# Patient Record
Sex: FEMALE | Race: WHITE | URBAN - METROPOLITAN AREA
[De-identification: names, ages, dates, MRNs, and addresses within clinical notes are randomized per-mention and may not be internally consistent; named-entity substitution may affect disease eponyms.]

---

## 2017-01-02 ENCOUNTER — TRANSFERRED RECORDS (OUTPATIENT)
Dept: HEALTH INFORMATION MANAGEMENT | Facility: CLINIC | Age: 22
End: 2017-01-02

## 2017-01-12 ENCOUNTER — OFFICE VISIT (OUTPATIENT)
Dept: FAMILY MEDICINE | Facility: CLINIC | Age: 22
End: 2017-01-12
Payer: COMMERCIAL

## 2017-01-12 VITALS
BODY MASS INDEX: 22.49 KG/M2 | TEMPERATURE: 97.6 F | HEART RATE: 94 BPM | SYSTOLIC BLOOD PRESSURE: 107 MMHG | DIASTOLIC BLOOD PRESSURE: 71 MMHG | OXYGEN SATURATION: 100 % | WEIGHT: 135 LBS | RESPIRATION RATE: 16 BRPM | HEIGHT: 65 IN

## 2017-01-12 DIAGNOSIS — F43.23 ADJUSTMENT DISORDER WITH MIXED ANXIETY AND DEPRESSED MOOD: Primary | ICD-10-CM

## 2017-01-12 PROCEDURE — 99203 OFFICE O/P NEW LOW 30 MIN: CPT | Performed by: FAMILY MEDICINE

## 2017-01-12 ASSESSMENT — ENCOUNTER SYMPTOMS
FEVER: 0
MEMORY LOSS: 0
INSOMNIA: 1
DIAPHORESIS: 0
PALPITATIONS: 0
SORE THROAT: 0
DEPRESSION: 1
DIZZINESS: 0
DOUBLE VISION: 0
WEIGHT LOSS: 0
SHORTNESS OF BREATH: 0
CHILLS: 0
HEADACHES: 0
WEAKNESS: 0
BLURRED VISION: 0
NERVOUS/ANXIOUS: 1
COUGH: 0

## 2017-01-12 ASSESSMENT — LIFESTYLE VARIABLES: SUBSTANCE_ABUSE: 0

## 2017-01-12 NOTE — PROGRESS NOTES
"HPI CC:  20 yo F new to the clinic presents to establish care for mood disorder and would like  A referral for counseling.      Anxiety and depression: this started June 2016 when she unfortunately delivered a stillborn baby (due in September).  She has noticed increased anxiety, worrying about small things that wouldn't normally bother her, feeling down and depressed, having poor appetite (denies weight loss), and sleeping poorly (hard to stay asleep, wakes early).  She denies any SI/HI, panic attacks, manic symptoms.  She denies a prior history of anxiety or depression.  She denies any family h/o mood disorder.  She has never been on a medication nor been hospitalized with mood.  She lives at home with her parents and sibling.  She is in a relationship with her boyfriend.  She feels safe in the relationship and at home.  Her mother is her main support person.  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  She denies any substance abuse.  She notes she was in the ER with \"passing out\" due to not eating/dehydration.      Review of Systems   Constitutional: Negative for fever, chills, weight loss, malaise/fatigue and diaphoresis.   HENT: Negative for congestion and sore throat.    Eyes: Negative for blurred vision and double vision.   Respiratory: Negative for cough and shortness of breath.    Cardiovascular: Negative for chest pain, palpitations and leg swelling.   Neurological: Negative for dizziness, weakness and headaches.   Psychiatric/Behavioral: Positive for depression. Negative for suicidal ideas, memory loss and substance abuse. The patient is nervous/anxious and has insomnia.        No Known Allergies   No current outpatient prescriptions on file.     No current facility-administered medications for this visit.     Active Ambulatory Problems     Diagnosis Date Noted     No Active Ambulatory Problems     Resolved Ambulatory Problems     Diagnosis Date Noted     No Resolved Ambulatory Problems     No " Additional Past Medical History     Social History     Social History     Marital Status: Single     Spouse Name: N/A     Number of Children: N/A     Years of Education: N/A     Occupational History     Not on file.     Social History Main Topics     Smoking status: Never Smoker      Smokeless tobacco: Not on file     Alcohol Use: No     Drug Use: No     Sexual Activity:     Partners: Male     Birth Control/ Protection: Pill     Other Topics Concern     Not on file     Social History Narrative     No narrative on file       Physical Exam   Constitutional: She is well-developed, well-nourished, and in no distress. No distress.   HENT:   Nose: Nose normal.   Mouth/Throat: Oropharynx is clear and moist. No oropharyngeal exudate.   Eyes: Conjunctivae and EOM are normal. Pupils are equal, round, and reactive to light. Right eye exhibits no discharge. Left eye exhibits no discharge. No scleral icterus.   Neck: Normal range of motion. Neck supple. No thyromegaly present.   Cardiovascular: Normal rate, regular rhythm, normal heart sounds and intact distal pulses.  Exam reveals no gallop and no friction rub.    No murmur heard.  Pulmonary/Chest: Effort normal and breath sounds normal. No respiratory distress. She has no wheezes. She has no rales.   Abdominal: Soft. Bowel sounds are normal. She exhibits no distension. There is no tenderness. There is no rebound and no guarding.   Lymphadenopathy:     She has no cervical adenopathy.   Neurological: She is alert. She has normal reflexes.   Skin: Skin is warm and dry. She is not diaphoretic.   Psychiatric: Memory and judgment normal. Her mood appears anxious. She exhibits a depressed mood. She expresses no homicidal and no suicidal ideation. She expresses no suicidal plans and no homicidal plans. She has a flat affect.   She appears depressed.   Speech is sparse but otherwise normal rate/rhythm.  Eye contact fairly normal.      Vitals reviewed.    /71 mmHg  Pulse 94   "Temp(Src) 97.6  F (36.4  C) (Oral)  Resp 16  Ht 5' 5\" (1.651 m)  Wt 135 lb (61.236 kg)  BMI 22.47 kg/m2  SpO2 100%  Breastfeeding? No    A/P  Adjustment disorder with anxiety and depression.      Agree with starting therapy, also recommended good nutrition, hydration, and exercise to improve mood.  I discussed that if her mood symptoms are worsening, to consider starting an SSRI (or mirtazepine to help with sleep and appetite).  I reviewed her ER note on the Near syncopal event, and reviewed her EKG (NSR with sinus arrhythma), UA with 100 protein and 15 ketones and 6-10 hyaline casts, glucose 148, normal renal fxn and lytes, neg troponin, WBC 13.2, normal H/H, platelets 444, normal LFTs,   Her symptoms were attributed to marijuana use and dehydration/hypotension resulting in vasovagal episode.  (If ongoing marijuana use, would also consider mood disorder secondary to substance, but she did not endorse this to me.)  She has had no prior or further events.    "

## 2017-01-12 NOTE — MR AVS SNAPSHOT
After Visit Summary   1/12/2017    Gwyn Mike    MRN: [id number]           Patient Information     Date Of Birth          1995        Visit Information        Provider Department      1/12/2017 11:20 AM Pari Watkins MD [facility]        Today's Diagnoses     Adjustment disorder with mixed anxiety and depressed mood    -  1        Follow-ups after your visit        Additional Services     MENTAL HEALTH REFERRAL       Your provider has referred you to: [facility]: [facility] - Counseling (Individual/Couples/Family) - [facility] [phone]   [email or web address]   *Patient will be contacted by [facility] scheduling partner, [facility]), to schedule an appointment.  Patients may also call [facility] to schedule.    All scheduling is subject to the client's specific insurance plan & benefits, provider/location availability, and provider clinical specialities.  Please arrive 15 minutes early for your first appointment and bring your completed paperwork.    Please be aware that coverage of these services is subject to the terms and limitations of your health insurance plan.  Call member services at your health plan with any benefit or coverage questions.                  Who to contact     If you have questions or need follow up information about today's clinic visit or your schedule please contact [facility] directly at [phone].  Normal or non-critical lab and imaging results will be communicated to you by MyChart, letter or phone within 4 business days after the clinic has received the results. If you do not hear from us within 7 days, please contact the clinic through MyChart or phone. If you have a critical or abnormal lab result, we will notify you by phone as soon as possible.  Submit refill requests through [employer] or call your  "pharmacy and they will forward the refill request to us. Please allow 3 business days for your refill to be completed.          Additional Information About Your Visit        MyChart Information     [employer] lets you send messages to your doctor, view your test results, renew your prescriptions, schedule appointments and more. To sign up, go to [email or web address] . Click on \"Log in\" on the left side of the screen, which will take you to the Welcome page. Then click on \"Sign up Now\" on the right side of the page.     You will be asked to enter the access code listed below, as well as some personal information. Please follow the directions to create your username and password.     Your access code is: [email or web address]  Expires: 2017 11:45 AM     Your access code will  in 90 days. If you need help or a new code, please call your [place] clinic or [phone].        Care EveryWhere ID     This is your Care EveryWhere ID. This could be used by other organizations to access your [place] medical records  [id number]        Your Vitals Were     Pulse Temperature Respirations Height BMI (Body Mass Index) Pulse Oximetry    94 97.6  F (36.4  C) (Oral) 16 5' 5\" (1.651 m) 22.47 kg/m2 100%    Breastfeeding?                   No            Blood Pressure from Last 3 Encounters:   17 107/71    Weight from Last 3 Encounters:   17 135 lb (61.236 kg) (63.70 %*)     * Growth percentiles are based on CDC 2-20 Years data.              We Performed the Following     MENTAL HEALTH REFERRAL        Primary Care Provider    None Specified       No primary provider on file.        Thank you!     Thank you for choosing [facility]  for your care. Our goal is always to provide you with excellent care. Hearing back from our patients is one way we can continue to improve our services. Please take a few minutes to complete the written survey that you may receive in the mail after your visit with us. " Thank you!             Your Updated Medication List - Protect others around you: Learn how to safely use, store and throw away your medicines at www.disposemymeds.org.      Notice  As of 1/12/2017 11:45 AM    You have not been prescribed any medications.

## 2017-01-12 NOTE — NURSING NOTE
"Chief Complaint   Patient presents with     Establish Care       Initial /71 mmHg  Pulse 94  Temp(Src) 97.6  F (36.4  C) (Oral)  Resp 16  Ht 5' 5\" (1.651 m)  Wt 135 lb (61.236 kg)  BMI 22.47 kg/m2  SpO2 100%  Breastfeeding? No Estimated body mass index is 22.47 kg/(m^2) as calculated from the following:    Height as of this encounter: 5' 5\" (1.651 m).    Weight as of this encounter: 135 lb (61.236 kg).  BP completed using cuff size: regular      Edmundo Sánchez MA      "

## 2017-01-13 ASSESSMENT — PATIENT HEALTH QUESTIONNAIRE - PHQ9: SUM OF ALL RESPONSES TO PHQ QUESTIONS 1-9: 10

## 2017-02-26 ENCOUNTER — TRANSFERRED RECORDS (OUTPATIENT)
Dept: HEALTH INFORMATION MANAGEMENT | Facility: CLINIC | Age: 22
End: 2017-02-26

## 2017-03-08 ENCOUNTER — OFFICE VISIT (OUTPATIENT)
Dept: PSYCHOLOGY | Facility: CLINIC | Age: 22
End: 2017-03-08
Attending: FAMILY MEDICINE
Payer: COMMERCIAL

## 2017-03-08 DIAGNOSIS — F43.22 ADJUSTMENT DISORDER WITH ANXIETY: Primary | ICD-10-CM

## 2017-03-08 PROCEDURE — 90791 PSYCH DIAGNOSTIC EVALUATION: CPT | Performed by: SOCIAL WORKER

## 2017-03-08 ASSESSMENT — PATIENT HEALTH QUESTIONNAIRE - PHQ9: 5. POOR APPETITE OR OVEREATING: MORE THAN HALF THE DAYS

## 2017-03-08 ASSESSMENT — ANXIETY QUESTIONNAIRES
5. BEING SO RESTLESS THAT IT IS HARD TO SIT STILL: NOT AT ALL
1. FEELING NERVOUS, ANXIOUS, OR ON EDGE: SEVERAL DAYS
IF YOU CHECKED OFF ANY PROBLEMS ON THIS QUESTIONNAIRE, HOW DIFFICULT HAVE THESE PROBLEMS MADE IT FOR YOU TO DO YOUR WORK, TAKE CARE OF THINGS AT HOME, OR GET ALONG WITH OTHER PEOPLE: NOT DIFFICULT AT ALL
7. FEELING AFRAID AS IF SOMETHING AWFUL MIGHT HAPPEN: SEVERAL DAYS
6. BECOMING EASILY ANNOYED OR IRRITABLE: SEVERAL DAYS
GAD7 TOTAL SCORE: 10
2. NOT BEING ABLE TO STOP OR CONTROL WORRYING: NEARLY EVERY DAY
3. WORRYING TOO MUCH ABOUT DIFFERENT THINGS: MORE THAN HALF THE DAYS

## 2017-03-08 NOTE — MR AVS SNAPSHOT
"                  MRN:[id number]                      After Visit Summary   3/8/2017    Gwyn Mike    MRN: [id number]           Visit Information        Provider Department      3/8/2017 2:00 PM Janice Galarza LICSW [facility] Generic      Your next 10 appointments already scheduled     Mar 20, 2017 10:00 AM CDT   Return Visit with Peyton Perdomo, [facility]    [address] [place] [address]   [phone]              MyChart Information     [employer] lets you send messages to your doctor, view your test results, renew your prescriptions, schedule appointments and more. To sign up, go to [email or web address] . Click on \"Log in\" on the left side of the screen, which will take you to the Welcome page. Then click on \"Sign up Now\" on the right side of the page.     You will be asked to enter the access code listed below, as well as some personal information. Please follow the directions to create your username and password.     Your access code is: [email or web address]  Expires: 2017 11:45 AM     Your access code will  in 90 days. If you need help or a new code, please call your [place] clinic or [phone].        Care EveryWhere ID     This is your Care EveryWhere ID. This could be used by other organizations to access your [place] medical records  [id number]        "

## 2017-03-08 NOTE — Clinical Note
This ptx completed her DA within [facility] and is wishing to transfer care to [facility] which is closer to home. DA will be dictated shortly for your review.

## 2017-03-08 NOTE — PROGRESS NOTES
"INITIAL ADULT ASSESSMENT      DATE OF SERVICE:  03/08/2017.      IDENTIFYING INFORMATION:  Gwyn Mike is a 21-year-old single , pregnant female, self-referred at the encouragement of her new GP at [facility] to this intake and she is alone in session with author.  Mother joined 10 minutes to the end of session to assist in transfer of client to a different clinic closer to home.       CLIENT'S STATEMENT OF PRESENTING CONCERN:  \"I have anxiety and I was feeling depressed.\"      HISTORY OF PRESENTING CONCERN:  Client states that she has been more anxious than depressed.  She stated that on 06/2016, she experienced a still birth 06/09 at 7 months of pregnancy.  She was offered a grief group as a resource, but did not go.  She stated that her symptoms became worse toward fall and she was admitted to the ER on 01/02/2017 after she fainted due to dehydration.  She met with her new GP and requested a referral to counseling in an effort to \"learn tips to relax and stay calm.\"  She states she is worrying a lot, especially about the new pregnancy.  She is 7 weeks pregnant, her second pregnancy with the same partner, her boyfriend,19, of 2 years.  She states she has her mother and her boyfriend for support and lists her strengths as \"I have strong family and my friends' support.\"  Client lives with her parents and her younger brother.  Her boyfriend lives with his parents. They have been together 2 years.  Client is being followed by an OB/GYN through [facility] who is a high-risk pregnancy consultant.  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  Due to being pregnant her OB states that she should not do employment that requires her to stand for more than 4 hours.  She is in the process of looking for work where she can sit such as office work.  She states her boyfriend is unemployed.      SOCIAL HISTORY:  Client grew up in [place], [place] and her parents remain " ".  She is the oldest of 2, having a 12-year-old brother in 7th grade.  Her mother works for the [employer] and her father works at the [employer].  She reports \"I had a happy, normal childhood.\"  She describes her current relationships as \"pretty good, I'm getting closer with all of them.\"  She has only been in 1 relationship, which is her current relationship.  She denies involvement with the legal system, past or current.  She graduated from [employer] and tried to attend [employer] school at [employer] this past fall of 2016, but had to quit for financial reasons.  She was raised Hinduism, but does not attend [employer] and English is her preferred written and spoken language.      MENTAL HEALTH HISTORY:  Client is unaware of other mental health issues in her family of origin.  Client has never had counseling before and has never been on medication before.        CHEMICAL USE HISTORY:  Client denies awareness of chemical use disorder in her family of origin and denies chemical use disorder in herself.  Her current CAGE-AID score is 0.  She denies using alcohol, tobacco, marijuana and other substances.  She does acknowledge 5 caffeinated beverages a month.      SIGNIFICANT LOSSES, TRAUMATIC EVENTS AND ABUSE HISTORY:  Client cites the stillbirth as a significant and traumatic loss.  She states both were planned pregnancies.  She states that [facility] through the first  pregnancy only half of her placenta was functional which caused the demise of the fetus.  The client denies a history of physical, sexual or emotional abuse or neglect.  She states that there are no safety concerns in her current dating relationship.      SAFETY ISSUES AND PLAN FOR SAFETY AND RISK MANAGEMENT:  Client denies a history of or current concerns with SI, HI or SIB.  She denies harm to other people or property and denies that there are firearms in her home.      MEDICAL ISSUES:  Client is new to [facility], having met only once with a [facility].  She denies a history of psychiatry and denies a history of major medical problems, acute or chronic pain or concern about her weight or eating habits.  She is on baby aspirin and prenatal vitamins.  Denies allergies or adverse reactions to medications.      MENTAL STATUS ASSESSMENT:  Client appeared somewhat younger than her stated age.  Her eye contact was fair.  Her orientation was to person, place, time and situation.  Her mood seemed mildly anxious and sad.  Affect while appropriate, was somewhat flat.  Her thought content appeared clear with denial of hallucinations, delusions, paranoia, SI or HI.  Her thought form was logical, coherent and goal directed.  Her psychomotor behavior was normal and speech rate and volume were both normal.      PSYCHOLOGICAL SYMPTOMS:  Client endorses grief since 06/2016, relationship issues, intermittent with her boyfriend stating that they argue a lot about little things, financial stressors and racing thoughts.  Client's PHQ-9 score is a 3 citing fatigue due to pregnancy and difficulty falling asleep for the past 2 weeks.  Her DULCE MARIA-7 score is a 10, citing nervousness, worry, difficulty controlling worry, difficulty relaxing, irritability and fearfulness as primary symptoms.      FUNCTIONAL STATUS:  Client reports general stability to manage activities of daily living and is motivated to seek counseling to broaden her base of support and to assist in managing anxiety.      DSM 5 DIAGNOSES:     1.  Adjustment Disorder with anxiety.    2.  Psychosocial stressors and context:  High-risk pregnancy 7 weeks gestation; still birth, 06/2016.    3.  WHODAS 2.0 is a 13; attendance agreement has been  reviewed and signed by client.        PRELIMINARY TREATMENT PLAN:  Mother came into session the final 10 minutes in order to assist in planning transfer from the [facility] to a clinic closer to the family's home.  They decided  [facility] would be a good fit and the transfer was facilitated in session through [facility] intake.  Client will return to [facility] on  at 10am. to meet with DEJAH Ladd, to begin individual therapy.  She is clearly motivated to begin working on developing better coping skills to manage anxiety and to process her residual grief.  This intake may be released to client upon her request with written authorization.         QUINTON RODRIGUEZ, [facility]             D: 2017 15:42   T: 2017 17:47   MT: PHILIPPE      Name:     KWASI WINTERS   MRN:      [id number]        Account:      [id number]   :      1995           Service Date: 2017      Document: [id number]

## 2017-03-08 NOTE — PROGRESS NOTES
Routed note to PCP                 Progress Note - Initial Session    Client Name:  Gwyn Mike Date: 3/8/2017           Service Type: Individual      Session Start Time: 2pm  Session End Time: 250      Session Length: 38 - 52      Session #: 1     Attendees: Client and with mother joining last 10 minutes.         Diagnostic Assessment in progress.  Unable to complete documentation at the conclusion of the first session due to time limits.      Mental Status Assessment:  Appearance:   Client and with mother joining last 10 minutes.  Eye Contact:   Fair   Psychomotor Behavior: Normal   Attitude:   Cooperative   Orientation:   All  Speech   Rate / Production: Normal  Slow    Volume:  Soft   Mood:    Anxious   Affect:    Flat   Thought Content:  Clear   Thought Form:  Coherent  Logical   Insight:    Fair       Safety Issues and Plan for Safety and Risk Management:  Client denies current fears or concerns for personal safety.  Client denies current or recent suicidal ideation or behaviors.  Client denies current or recent homicidal ideation or behaviors.  Client denies current or recent self injurious behavior or ideation.  Client denies other safety concerns.  A safety and risk management plan has not been developed at this time, however client was given the after-hours number / 911 should there be a change in any of these risk factors.  Client reports there are no firearms in the house.      Diagnostic Criteria:  A. The development of emotional or behavioral symptoms in response to an identifiable stressor(s) occurring within 3 months of the onset of the stressor(s)  B. These symptoms or behaviors are clinically significant, as evidenced by one or both of the following:  C. The stress-related disturbance does not meet criteria for another disorder & is not not an exacerbation of another mental disorder  D. The symptoms do not represent normal bereavement  E. Once the stressor or its consequences have  terminated, the symptoms do not persist for more than an additional 6 months       * Adjustment Disorder with Anxiety: The predominant manfestations are symptoms such as nervousness, worry, or jitteriness, or, in children separation anxiety from major attachment figures        DSM5 Diagnoses: (Sustained by DSM5 Criteria Listed Above)  Diagnoses: Adjustment Disorders  309.24 (F43.22) With anxiety  Psychosocial & Contextual Factors: 7 weeks pregnant; hx of stillbirth 6/16.  WHODAS 2.0 (12 item)            This questionnaire asks about difficulties due to health conditions. Health conditions  include  disease or illnesses, other health problems that may be short or long lasting,  injuries, mental health or emotional problems, and problems with alcohol or drugs.                     Think back over the past 30 days and answer these questions, thinking about how much  difficulty you had doing the following activities. For each question, please [place] only  one response.    S1 Standing for long periods such as 30 minutes? None =         1   S2 Taking care of household responsibilities? None =         1   S3 Learning a new task, for example, learning how to get to a new place? None =         1   S4 How much of a problem do you have joining community activities (for example, festivals, [employer] or other activities) in the same way as anyone else can? None =         1   S5 How much have you been emotionally affected by your health problems? Mild =           2     In the past 30 days, how much difficulty did you have in:   S6 Concentrating on doing something for ten minutes? None =         1   S7 Walking a long distance such as a kilometer (or equivalent)? None =         1   S8 Washing your whole body? None =         1   S9 Getting dressed? None =         1   S10 Dealing with people you do not know? None =         1   S11 Maintaining a friendship? None =         1   S12 Your day to day work? None =         1     H1 Overall,  in the past 30 days, how many days were these difficulties present? Record number of days 0   H2 In the past 30 days, for how many days were you totally unable to carry out your usual activities or work because of any health condition? Record number of days  0   H3 In the past 30 days, not counting the days that you were totally unable, for how many days did you cut back or reduce your usual activities or work because of any health condition? Record number of days 0       Collateral Reports Completeand transfer therapist.Routed note to PCP  and transfer therapist.      PLAN: (Homework, other):  Client is in early stages of a high risk pregnancy. She is new to the  system and would like counseling to help manage anxiety that began 6/16 worsening from 10/16 on.  She would like to be transferred to a counselor closer to home in [facility]. This transfer was facilitated at end of intake session. CT will meet with Peyton Perdomo at [facility] 3/20/17 at 10am.      GLADYS Liu

## 2017-03-09 ASSESSMENT — ANXIETY QUESTIONNAIRES: GAD7 TOTAL SCORE: 10

## 2017-03-09 ASSESSMENT — PATIENT HEALTH QUESTIONNAIRE - PHQ9: SUM OF ALL RESPONSES TO PHQ QUESTIONS 1-9: 1

## 2017-03-20 ENCOUNTER — OFFICE VISIT (OUTPATIENT)
Dept: PSYCHOLOGY | Facility: CLINIC | Age: 22
End: 2017-03-20
Payer: COMMERCIAL

## 2017-03-20 DIAGNOSIS — F43.22 ADJUSTMENT DISORDER WITH ANXIETY: Primary | ICD-10-CM

## 2017-03-20 PROCEDURE — 90837 PSYTX W PT 60 MINUTES: CPT | Performed by: COUNSELOR

## 2017-03-20 ASSESSMENT — ANXIETY QUESTIONNAIRES
6. BECOMING EASILY ANNOYED OR IRRITABLE: MORE THAN HALF THE DAYS
2. NOT BEING ABLE TO STOP OR CONTROL WORRYING: MORE THAN HALF THE DAYS
3. WORRYING TOO MUCH ABOUT DIFFERENT THINGS: MORE THAN HALF THE DAYS
7. FEELING AFRAID AS IF SOMETHING AWFUL MIGHT HAPPEN: SEVERAL DAYS
GAD7 TOTAL SCORE: 13
IF YOU CHECKED OFF ANY PROBLEMS ON THIS QUESTIONNAIRE, HOW DIFFICULT HAVE THESE PROBLEMS MADE IT FOR YOU TO DO YOUR WORK, TAKE CARE OF THINGS AT HOME, OR GET ALONG WITH OTHER PEOPLE: VERY DIFFICULT
5. BEING SO RESTLESS THAT IT IS HARD TO SIT STILL: NOT AT ALL
1. FEELING NERVOUS, ANXIOUS, OR ON EDGE: NEARLY EVERY DAY

## 2017-03-20 ASSESSMENT — PATIENT HEALTH QUESTIONNAIRE - PHQ9: 5. POOR APPETITE OR OVEREATING: NEARLY EVERY DAY

## 2017-03-20 NOTE — Clinical Note
Hello - I am routing the first session note from the therapy appointment with REBECCA Mike. We will be reviewing treatment goals in the second session.  Please let me know if you have any questions.  Peyton Perdomo MA, LPCC, RPT

## 2017-03-20 NOTE — PROGRESS NOTES
Progress Note    Client Name: Gwyn Mike  Date: 3/20/2017           Service Type: Individual      Session Start Time: 10 am  Session End Time: 11 am      Session Length: 60 minutes     Session #: 1     Attendees: Client    Treatment Plan Last Reviewed: Treatment goals were discussed and will be formally established with client in next two sessions.  PHQ-9 / DULCE MARIA-7 : Not assessed     DATA      Progress Since Last Session (Related to Symptoms / Goals / Homework):   Symptoms: Stable    Homework: First session with client      Episode of Care Goals: Pre-Contemplation of working on reduction of anxiety symptoms     Current / Ongoing Stressors and Concerns:   Client is currently in her first trimester of pregnancy. Client miscarried in June 2017 and is anxious regarding the health of this fetus. Client is experiencing heightened anxiety due to worry about her pregnancy, as well as within relationships (e.g., arguing with boyfriend and mother).     Treatment Objective(s) Addressed in This Session:   Develop coping skills to navigate her anxiety in the moment. Introduced diaphragmatic breathing to client and practiced with her in session.     Intervention:   CBT: restructure distorted thoughts of incapacity to regulate her emotional response        ASSESSMENT: Current Emotional / Mental Status (status of significant symptoms):   Risk status (Self / Other harm or suicidal ideation)   Client denies current fears or concerns for personal safety.   Client denies current or recent suicidal ideation or behaviors.   Client denies current or recent homicidal ideation or behaviors.   Client denies current or recent self injurious behavior or ideation.   Client denies other safety concerns.   A safety and risk management plan has not been developed at this time, however client was given the after-hours number / 911 should there be a change in any of these risk  factors.     Appearance:   Appropriate    Eye Contact:   Fair    Psychomotor Behavior: Retarded (Slowed)    Attitude:   Guarded    Orientation:   All   Speech    Rate / Production: Slow     Volume:  Soft    Mood:    Anxious  Depressed    Affect:    Flat    Thought Content:  Rumination    Thought Form:  Circumstantial   Insight:    Fair      Medication Review:   No changes to current psychiatric medication(s)     Medication Compliance:   Yes     Changes in Health Issues:   None reported     Chemical Use Review:   Substance Use: Chemical use reviewed, no active concerns identified      Tobacco Use: No current tobacco use.       Collateral Reports Completed:   Not Applicable    PLAN: (Client Tasks / Therapist Tasks / Other)  Client will use deep breathing as a coping skill at a minimum of 3x/day to increase her capacity to manage anxiety symptoms in the moment.        Peyton Perdomo MA, [facility], RPT 3/20/2017

## 2017-03-21 ASSESSMENT — ANXIETY QUESTIONNAIRES: GAD7 TOTAL SCORE: 13

## 2017-03-21 ASSESSMENT — PATIENT HEALTH QUESTIONNAIRE - PHQ9: SUM OF ALL RESPONSES TO PHQ QUESTIONS 1-9: 4

## 2017-04-03 ENCOUNTER — OFFICE VISIT (OUTPATIENT)
Dept: PSYCHOLOGY | Facility: CLINIC | Age: 22
End: 2017-04-03
Payer: COMMERCIAL

## 2017-04-03 DIAGNOSIS — F41.9 ANXIETY: Primary | ICD-10-CM

## 2017-04-03 PROCEDURE — 90837 PSYTX W PT 60 MINUTES: CPT | Performed by: COUNSELOR

## 2017-04-03 NOTE — Clinical Note
Hello -- I am routing REBECCA Mike recent progress note with preliminary treatment plan to address adjustment symptoms of anxiety related to her current high-risk pregnancy.  Please let me know if you have any questions. Thank you, Peyton Perdomo MA, LP[facility], RPT

## 2017-04-03 NOTE — PROGRESS NOTES
Progress Note    Client Name: Gwyn Mike  Date: 4/3/2017         Service Type: Individual      Session Start Time: 1 pm  Session End Time: 2 pm      Session Length: 60 minutes     Session #: 2     Attendees: Client    Treatment Plan Last Reviewed: Treatment goals were discussed and created with input from client during session. Next Tx Plan review will be 7/3/2017.  PHQ-9 / DULCE MARIA-7 : Not assessed. Will be assessed with client in next session.     DATA      Progress Since Last Session (Related to Symptoms / Goals / Homework):   Symptoms: Improved    Homework: Partially completed      Episode of Care Goals: Satisfactory progress - ACTION (Actively working towards change); Intervened by reinforcing change plan / affirming steps taken     Current / Ongoing Stressors and Concerns:  Client reported reduced anxiety symptoms today due to ultrasound early this morning that showed the fetus growing at normal proportion with no observed concerns. [Client is currently in her first trimester of pregnancy]. Client miscarried in June 2017 and is anxious regarding the health of this fetus. Client continues to feel worried and anxious regarding health of her baby.     Treatment Objective(s) Addressed in This Session:   Develop coping skills to navigate her anxiety in the moment. Introduced diaphragmatic breathing to client and practiced with her in session.     Intervention:   Solution Focused: continuation of self-advocacy with primary supports for better understanding and communication surrounding client's anxiety         ASSESSMENT: Current Emotional / Mental Status (status of significant symptoms):    Risk status (Self / Other harm or suicidal ideation)   Client denies current fears or concerns for personal safety.   Client denies current or recent suicidal ideation or behaviors.   Client denies current or recent homicidal ideation or behaviors.   Client denies current or  "recent self injurious behavior or ideation.   Client denies other safety concerns.   A safety and risk management plan has not been developed at this time, however client was given the after-hours number / 911 should there be a change in any of these risk factors.     Appearance:   Appropriate    Eye Contact:   Fair    Psychomotor Behavior: Retarded (Slowed)    Attitude:   Cooperative    Orientation:   All   Speech    Rate / Production: Slow     Volume:  Soft    Mood:    Anxious    Affect:    Flat    Thought Content:  Clear    Thought Form:  Circumstantial   Insight:    Fair      Medication Review:   No changes to current psychiatric medication(s)     Medication Compliance:   Yes     Changes in Health Issues:   None reported     Chemical Use Review:   Substance Use: Chemical use reviewed, no active concerns identified      Tobacco Use: No current tobacco use.       Collateral Reports Completed:   Not Applicable    PLAN: (Client Tasks / Therapist Tasks / Other)  Client will continue to advocate for herself with  Mother and boyfriend regarding the types of verbal support she needs from them, and increase her awareness of anxious thought processes for use of deep breathing as a coping skill (minimum of 3x/day). Recommended to client that she alert her current OB/GYN to anxiety symptoms she is experiencing.      Peyton Perdomo MA, [facility], RPT 4/3/2017                                                     Treatment Plan    Client's Name: Gwyn Mike  YOB: 1995    Date: 4/3/2017    DSM-V Diagnoses: 300.09 (F41.8) Other Specified Anxiety Disorder   Psychosocial / Contextual Factors: Heightened anxiety due to current high-risk pregnancy. Client has a stillbirth last year, and has also experienced sadness/loss with that child. Client reported that both were \"planned\" pregnancies, though she added that her current pregnancy is a result of having created an idea of being a mother during her first pregnancy " "and that she wanted to be pregnant again because it was intolerable to her that \"it just all suddenly ended\".    WHODAS: N/A    Referral / Collaboration:  Referral to another professional/service is not indicated at this time..    Anticipated number of session or this episode of care: 10    MeasurableTreatment Goal(s) related to diagnosis / functional impairment(s)  Goal 1: Client will use coping strategies for reduction of anxiety/worrying thoughts    Objective #A (Client Action)    Client will practice deep breathing at least 3 times a day.  Status: Continued     Intervention(s)  Therapist will review diaphragmatic breathing with client in session and practice for increased confidence in use.    Objective #B  Client will attend and participate in social or recreational activities that are enjoyable and healthy to the client and her baby (e.g., walks with boyfriend and her other friends, schedule weekly outings at mall/movies/dinner.  Status: Continued     Intervention(s)  Therapist will assign homework for client to do at least one physical and one social activity per week.    Objective #C  Client will go to bed prior to 11 p.m. each night, and use guided imagery to reduce worrisome thoughts.  Status: Continued - Date(s):     Intervention(s)  Therapist will teach guided imagery.      Client has reviewed and agreed to the above plan.      Peyton Perdomo MA, [facility], RPT 4/3/2017                "

## 2017-04-19 ENCOUNTER — TELEPHONE (OUTPATIENT)
Dept: FAMILY MEDICINE | Facility: CLINIC | Age: 22
End: 2017-04-19

## 2017-04-19 NOTE — TELEPHONE ENCOUNTER
"Mother, Rekha, calling for pt. No \"Consent to Communicate\" form on file. Looking for referral so pt, who is pregnant, can continue to see Ob Gyn at [facility]. Pt's insurance is  [employer]  And [facility] is out of network and therefore a referral will not be granted. Directed mother to [facility] [phone] for further care.  "

## 2017-05-01 ENCOUNTER — OFFICE VISIT (OUTPATIENT)
Dept: PSYCHOLOGY | Facility: CLINIC | Age: 22
End: 2017-05-01
Payer: COMMERCIAL

## 2017-05-01 DIAGNOSIS — F41.9 ANXIETY: Primary | ICD-10-CM

## 2017-05-01 PROCEDURE — 90834 PSYTX W PT 45 MINUTES: CPT | Performed by: COUNSELOR

## 2017-05-01 ASSESSMENT — ANXIETY QUESTIONNAIRES
6. BECOMING EASILY ANNOYED OR IRRITABLE: SEVERAL DAYS
7. FEELING AFRAID AS IF SOMETHING AWFUL MIGHT HAPPEN: NOT AT ALL
3. WORRYING TOO MUCH ABOUT DIFFERENT THINGS: SEVERAL DAYS
IF YOU CHECKED OFF ANY PROBLEMS ON THIS QUESTIONNAIRE, HOW DIFFICULT HAVE THESE PROBLEMS MADE IT FOR YOU TO DO YOUR WORK, TAKE CARE OF THINGS AT HOME, OR GET ALONG WITH OTHER PEOPLE: NOT DIFFICULT AT ALL
1. FEELING NERVOUS, ANXIOUS, OR ON EDGE: SEVERAL DAYS
2. NOT BEING ABLE TO STOP OR CONTROL WORRYING: SEVERAL DAYS
5. BEING SO RESTLESS THAT IT IS HARD TO SIT STILL: NOT AT ALL
GAD7 TOTAL SCORE: 4

## 2017-05-01 ASSESSMENT — PATIENT HEALTH QUESTIONNAIRE - PHQ9: 5. POOR APPETITE OR OVEREATING: NOT AT ALL

## 2017-05-01 NOTE — PROGRESS NOTES
Progress Note    Client Name: Gwyn Mike  Date: 5/1/2017         Service Type: Individual      Session Start Time: 10:05 am  Session End Time: 10:55 am      Session Length: 50 minutes     Session #: 3     Attendees: Client    Treatment Plan Last Reviewed: Treatment goals reviewed in session. Next formal Treatment Plan update will be 7/3/2017.  PHQ-9 / DULCE MARIA-7 : Assessed in session.     DATA      Progress Since Last Session (Related to Symptoms / Goals / Homework):   Symptoms: Improved - PHQ-9 and DULCE MARIA-7 completed in session by client. Scores significantly lower than in first session with a composite score of 1 on PHQ9 and 4 on GAD7.    Homework: Achieved / completed to satisfaction - client engaged in self-care with connecting more with supportive friend. Client purchased a doppler to use at home to listen to baby's heartbeat at night, which she reported reduces her anxiety levels.      Episode of Care Goals: Satisfactory progress - ACTION (Actively working towards change); Intervened by reinforcing change plan / affirming steps taken     Current / Ongoing Stressors and Concerns:  Client reported continuation of low anxiety symptoms as result of having purchased an in-home doppler kit to monitor baby's heart beat with more regularity. Client stated that she uses it at night as a means to relax herself while listening to baby's heartbeat. Praised client for her proactive approach to reducing anxiety around the health and progress of this pregnancy. [Client is currently in her second trimester of pregnancy]. Discussed client's current supports and areas that she may still want to seek more assistance, such as pregnant mother group or ECFE.     Treatment Objective(s) Addressed in This Session:   Reviewed coping skills and supports in client's life to assist with reduction of anxiety around her current high-risk pregnancy.       Intervention:   Motivational  Interviewing: use of affirmative statements of client's strengths and proactive behaviors to manage anxiety around pregnancy. Affirmations provided to continue building client's confidence to manage anxious thoughts and to increase support from boyfriend through proactive sharing and asking for what she needs.      ASSESSMENT: Current Emotional / Mental Status (status of significant symptoms):    Risk status (Self / Other harm or suicidal ideation)   Client denies current fears or concerns for personal safety.   Client denies current or recent suicidal ideation or behaviors.   Client denies current or recent homicidal ideation or behaviors.   Client denies current or recent self injurious behavior or ideation.   Client denies other safety concerns.   A safety and risk management plan has not been developed at this time, however client was given the after-hours number / 911 should there be a change in any of these risk factors.     Appearance:   Appropriate    Eye Contact:   Good    Psychomotor Behavior: Normal    Attitude:   Cooperative    Orientation:   All   Speech    Rate / Production: Slow     Volume:  Soft    Mood:    Normal   Affect:    Appropriate    Thought Content:  Clear    Thought Form:  Circumstantial   Insight:    Good      Medication Review:   No current psychiatric medications prescribed     Medication Compliance:   NA     Changes in Health Issues:   None reported - client reported that her nausea (morning sickness) has abated to less frequent and less intense, and that she has more of an appetite now.     Chemical Use Review:   Substance Use: Chemical use reviewed, no active concerns identified      Tobacco Use: No current tobacco use.       Collateral Reports Completed:   Not Applicable    PLAN: (Client Tasks / Therapist Tasks / Other)  Client will continue to use self-calming skills for reduction of anxiety around health of her current pregnancy. Client will amplify her supports in community (e.g.,  "research pregnancy support groups/baby & mommy classes), and proactively share with boyfriend need for more interaction and support from him. Client requested that sessions be every other month based on stabilization of mood at this time. Client will call to schedule next appointment in June.    Peyton Perdomo MA, [facility], RPT 5/1/2017                                                     Treatment Plan    Client's Name: Gwyn Mike  YOB: 1995    Date: 4/3/2017    DSM-V Diagnoses: 300.09 (F41.8) Other Specified Anxiety Disorder   Psychosocial / Contextual Factors: Heightened anxiety due to current high-risk pregnancy. Client has a stillbirth last year, and has also experienced sadness/loss with that child. Client reported that both were \"planned\" pregnancies, though she added that her current pregnancy is a result of having created an idea of being a mother during her first pregnancy and that she wanted to be pregnant again because it was intolerable to her that \"it just all suddenly ended\".    WHODAS: N/A    Referral / Collaboration:  Referral to another professional/service is not indicated at this time..    Anticipated number of session or this episode of care: 10    MeasurableTreatment Goal(s) related to diagnosis / functional impairment(s)  Goal 1: Client will use coping strategies for reduction of anxiety/worrying thoughts    Objective #A (Client Action)    Client will practice deep breathing at least 3 times a day.  Status: Continued - 5/1/17: client reported that she practices deep breathing more than 3x/day for self-calming    Intervention(s)  Therapist will review diaphragmatic breathing with client in session and practice for increased confidence in use.    Objective #B  Client will attend and participate in social or recreational activities that are enjoyable and healthy to the client and her baby (e.g., walks with boyfriend and her other friends, schedule weekly outings at " mall/movies/dinner.  Status: Continued - 5/1/17: client reported that she is engaging in more social outings with two of her friends, sometimes during the daytime when she is most often alone at home.    Intervention(s)  Therapist will assign homework for client to do at least one physical and one social activity per week.    Objective #C  Client will go to bed prior to 11 p.m. each night, and use guided imagery to reduce worrisome thoughts.  Status: Continued - Date(s): 5/1/17: client reported going to bed between 10:30 and 11 p.m., with use of a doppler machine to listen to baby's heartbeat to reduce anxiety regarding health of baby    Intervention(s)  Therapist will teach guided imagery.      Client has reviewed and agreed to the above plan.      Peyton Perdomo MA, [facility], RPT 4/3/2017

## 2017-05-02 ASSESSMENT — ANXIETY QUESTIONNAIRES: GAD7 TOTAL SCORE: 4

## 2017-05-02 ASSESSMENT — PATIENT HEALTH QUESTIONNAIRE - PHQ9: SUM OF ALL RESPONSES TO PHQ QUESTIONS 1-9: 1

## 2017-05-04 ENCOUNTER — FCC EXTENDED DOCUMENTATION (OUTPATIENT)
Dept: PSYCHOLOGY | Facility: CLINIC | Age: 22
End: 2017-05-04

## 2017-05-04 NOTE — PROGRESS NOTES
Discharge Summary  Single Session    Client Name: Gwyn Mike MRN#: [id number] YOB: 1995      Intake / Discharge Date: 3/8/17      DSM5 Diagnoses: (Sustained by DSM5 Criteria Listed Above)  Diagnoses: 300.00 (F41.9) Unspecified Anxiety Disorder  Psychosocial & Contextual Factors: no previous therapy or medications. Hx of stillborn birth and currently pregnant.  WHODAS 2.0 (12 item) Score: see intake.          Presenting Concern:  Anxiety, sadness.      Reason for Discharge:  Referred to [facility] therapist closer to home, Peyton Perdom[facility].      Disposition at Time of Last Encounter:   Comments:        Risk Management:   Client denies a history of suicidal ideation, suicide attempts, self-injurious behavior, homicidal ideation, homicidal behavior and and other safety concerns  A safety and risk management plan has not been developed at this time, however client was given the after-hours number / 911 should there be a change in any of these risk factors.      Referred To:  Peyton Perdomo, [facility] therapist, [facility].        Janice Galarza, [facility]   5/4/2017

## 2017-06-23 DIAGNOSIS — Z11.3 SCREEN FOR STD (SEXUALLY TRANSMITTED DISEASE): Primary | ICD-10-CM

## 2017-08-30 ENCOUNTER — TRANSFERRED RECORDS (OUTPATIENT)
Dept: HEALTH INFORMATION MANAGEMENT | Facility: CLINIC | Age: 22
End: 2017-08-30

## 2017-09-23 ENCOUNTER — TRANSFERRED RECORDS (OUTPATIENT)
Dept: HEALTH INFORMATION MANAGEMENT | Facility: CLINIC | Age: 22
End: 2017-09-23

## 2017-10-03 ENCOUNTER — TRANSFERRED RECORDS (OUTPATIENT)
Dept: HEALTH INFORMATION MANAGEMENT | Facility: CLINIC | Age: 22
End: 2017-10-03

## 2017-11-30 ENCOUNTER — FCC EXTENDED DOCUMENTATION (OUTPATIENT)
Dept: PSYCHOLOGY | Facility: CLINIC | Age: 22
End: 2017-11-30

## 2017-11-30 NOTE — PROGRESS NOTES
"                      Discharge Summary  Multiple Sessions    Client Name: Gwyn Mike MRN#: [id number] YOB: 1995      Intake / Discharge Date:   Transfer date to this therapist:  3/20/2017  Discharge date: 9/11/2017      DSM5 Diagnoses: (Sustained by DSM5 Criteria Listed Above)  Diagnoses: Adjustment Disorders  309.24 (F43.22) With anxiety  Psychosocial & Contextual Factors:  Client was in first trimester of pregnancy. Client miscarried in June 2017. Client was experiencing heightened anxiety due to worry about current pregnancy, as well as within relationships (e.g., arguing with boyfriend and mother).  WHODAS 2.0 (12 item) Score: Please refer to Diagnostic Assessment for result    Presenting Concern:  Client was in first trimester of pregnancy. She had miscarried in June 2017 and presented as anxious regarding current pregnancy.    Reason for Discharge:  Client did not return      Disposition at Time of Last Encounter:   Comments:   Client was inconsistent in scheduling appointments and reported \"feeling better\" about her pregnancy.     Risk Management:   Client denies a history of suicidal ideation, suicide attempts, self-injurious behavior, homicidal ideation, homicidal behavior and and other safety concerns  A safety and risk management plan has not been developed at this time, however client was given the after-hours number / 911 should there be a change in any of these risk factors.      Referred To:  Client was discharged due to no response from case closure letter sent to her in July 2017, and had no future appointments scheduled with this therapist.    Peyton Perdomo MA, [facility], RPT 9/11/2017    "

## 2018-04-04 ENCOUNTER — TRANSFERRED RECORDS (OUTPATIENT)
Dept: HEALTH INFORMATION MANAGEMENT | Facility: CLINIC | Age: 23
End: 2018-04-04

## 2018-08-31 ENCOUNTER — OFFICE VISIT (OUTPATIENT)
Dept: URGENT CARE | Facility: URGENT CARE | Age: 23
End: 2018-08-31

## 2018-08-31 VITALS
OXYGEN SATURATION: 97 % | RESPIRATION RATE: 18 BRPM | HEART RATE: 101 BPM | SYSTOLIC BLOOD PRESSURE: 108 MMHG | DIASTOLIC BLOOD PRESSURE: 70 MMHG | TEMPERATURE: 98.8 F

## 2018-08-31 DIAGNOSIS — Z53.9 DIAGNOSIS NOT YET DEFINED: Primary | ICD-10-CM

## 2018-08-31 NOTE — MR AVS SNAPSHOT
After Visit Summary   8/31/2018    Gwyn Mike    MRN: [id number]           Patient Information     Date Of Birth          1995        Visit Information        Provider Department      8/31/2018 5:50 PM Provider, Kandi [facility]        Today's Diagnoses     DIAGNOSIS NOT YET DEFINED    -  1       Follow-ups after your visit        Who to contact     If you have questions or need follow up information about today's clinic visit or your schedule please contact [facility] directly at [phone].  Normal or non-critical lab and imaging results will be communicated to you by MyChart, letter or phone within 4 business days after the clinic has received the results. If you do not hear from us within 7 days, please contact the clinic through MyChart or phone. If you have a critical or abnormal lab result, we will notify you by phone as soon as possible.  Submit refill requests through [employer] or call your pharmacy and they will forward the refill request to us. Please allow 3 business days for your refill to be completed.          Additional Information About Your Visit        Care EveryWhere ID     This is your Care EveryWhere ID. This could be used by other organizations to access your [place] medical records  [id number]        Your Vitals Were     Pulse Temperature Respirations Pulse Oximetry          101 98.8  F (37.1  C) (Oral) 18 97%         Blood Pressure from Last 3 Encounters:   08/31/18 108/70   01/12/17 107/71    Weight from Last 3 Encounters:   01/12/17 135 lb (61.2 kg) (64 %)*     * Growth percentiles are based on CDC 2-20 Years data.              Today, you had the following     No orders found for display       Primary Care Provider    Provider Not In System                Equal Access to Services     VAN ALBERTO : Valentino White, lea shah, dusty guardado  millicent gordon ah. So [facility] [phone].    ATENCIÓN: Si habla español, tiene a valdez disposición servicios gratuitos de asistencia lingüística. James al [phone].    We comply with applicable federal civil rights laws and [place] laws. We do not discriminate on the basis of race, color, national origin, age, disability, sex, sexual orientation, or gender identity.            Thank you!     Thank you for choosing [facility]  for your care. Our goal is always to provide you with excellent care. Hearing back from our patients is one way we can continue to improve our services. Please take a few minutes to complete the written survey that you may receive in the mail after your visit with us. Thank you!             Your Updated Medication List - Protect others around you: Learn how to safely use, store and throw away your medicines at www.disposemymeds.org.      Notice  As of 8/31/2018  5:53 PM    You have not been prescribed any medications.

## 2019-01-03 ENCOUNTER — RECORDS - HEALTHEAST (OUTPATIENT)
Dept: LAB | Facility: CLINIC | Age: 24
End: 2019-01-03

## 2019-01-07 LAB
BKR LAB AP ABNORMAL BLEEDING: NO
BKR LAB AP BIRTH CONTROL/HORMONES: NORMAL
BKR LAB AP CERVICAL APPEARANCE: NORMAL
BKR LAB AP GYN ADEQUACY: NORMAL
BKR LAB AP GYN INTERPRETATION: NORMAL
BKR LAB AP HPV REFLEX: NORMAL
BKR LAB AP LMP: NORMAL
BKR LAB AP PATIENT STATUS: NORMAL
BKR LAB AP PREVIOUS ABNORMAL: NORMAL
BKR LAB AP PREVIOUS NORMAL: NORMAL
C TRACH DNA SPEC QL PROBE+SIG AMP: POSITIVE
HIGH RISK?: NO
N GONORRHOEA DNA SPEC QL NAA+PROBE: NEGATIVE
PATH REPORT.COMMENTS IMP SPEC: NORMAL
RESULT FLAG (HE HISTORICAL CONVERSION): NORMAL

## 2019-03-06 ENCOUNTER — RECORDS - HEALTHEAST (OUTPATIENT)
Dept: LAB | Facility: CLINIC | Age: 24
End: 2019-03-06

## 2019-03-06 LAB — HIV 1+2 AB+HIV1 P24 AG SERPL QL IA: NEGATIVE

## 2019-03-07 LAB
C TRACH DNA SPEC QL PROBE+SIG AMP: NEGATIVE
N GONORRHOEA DNA SPEC QL NAA+PROBE: NEGATIVE
T PALLIDUM AB SER QL: NEGATIVE

## 2019-07-03 ENCOUNTER — RECORDS - HEALTHEAST (OUTPATIENT)
Dept: ADMINISTRATIVE | Facility: OTHER | Age: 24
End: 2019-07-03

## 2019-07-08 ENCOUNTER — RECORDS - HEALTHEAST (OUTPATIENT)
Dept: ADMINISTRATIVE | Facility: OTHER | Age: 24
End: 2019-07-08

## 2019-07-12 ENCOUNTER — RECORDS - HEALTHEAST (OUTPATIENT)
Dept: RADIOLOGY | Facility: CLINIC | Age: 24
End: 2019-07-12

## 2019-07-12 ENCOUNTER — AMBULATORY - HEALTHEAST (OUTPATIENT)
Dept: SURGERY | Facility: CLINIC | Age: 24
End: 2019-07-12

## 2019-07-12 DIAGNOSIS — N63.10 LUMP OF RIGHT BREAST: ICD-10-CM

## 2019-07-17 ENCOUNTER — HOSPITAL ENCOUNTER (OUTPATIENT)
Dept: SURGERY | Facility: CLINIC | Age: 24
Discharge: HOME | End: 2019-07-17
Attending: SURGERY

## 2019-07-17 DIAGNOSIS — N63.10 LUMP OF RIGHT BREAST: ICD-10-CM

## 2019-07-17 RX ORDER — NORELGESTROMIN AND ETHINYL ESTRADIOL 150; 35 UG/D; UG/D
1 PATCH TRANSDERMAL WEEKLY
Refills: 3 | Status: SHIPPED | COMMUNITY
Start: 2019-06-26

## 2019-07-17 ASSESSMENT — MIFFLIN-ST. JEOR: SCORE: 1341.02

## 2019-07-22 ENCOUNTER — ANESTHESIA - HEALTHEAST (OUTPATIENT)
Dept: SURGERY | Facility: AMBULATORY SURGERY CENTER | Age: 24
End: 2019-07-22

## 2019-07-22 ASSESSMENT — MIFFLIN-ST. JEOR: SCORE: 1341.02

## 2019-07-23 ENCOUNTER — SURGERY - HEALTHEAST (OUTPATIENT)
Dept: SURGERY | Facility: AMBULATORY SURGERY CENTER | Age: 24
End: 2019-07-23

## 2019-12-05 ENCOUNTER — RECORDS - HEALTHEAST (OUTPATIENT)
Dept: LAB | Facility: CLINIC | Age: 24
End: 2019-12-05

## 2019-12-05 LAB — HIV 1+2 AB+HIV1 P24 AG SERPL QL IA: NEGATIVE

## 2019-12-06 LAB — T PALLIDUM AB SER QL: NEGATIVE

## 2019-12-09 LAB
C TRACH DNA SPEC QL PROBE+SIG AMP: POSITIVE
N GONORRHOEA DNA SPEC QL NAA+PROBE: NEGATIVE

## 2020-01-10 ENCOUNTER — RECORDS - HEALTHEAST (OUTPATIENT)
Dept: LAB | Facility: CLINIC | Age: 25
End: 2020-01-10

## 2020-01-10 LAB — HIV 1+2 AB+HIV1 P24 AG SERPL QL IA: NEGATIVE

## 2020-01-11 LAB — T PALLIDUM AB SER QL: NEGATIVE

## 2020-01-13 LAB
C TRACH DNA SPEC QL PROBE+SIG AMP: NEGATIVE
N GONORRHOEA DNA SPEC QL NAA+PROBE: NEGATIVE

## 2020-02-06 ENCOUNTER — RECORDS - HEALTHEAST (OUTPATIENT)
Dept: LAB | Facility: CLINIC | Age: 25
End: 2020-02-06

## 2020-02-07 LAB
BACTERIA SPEC CULT: NO GROWTH
C TRACH DNA SPEC QL PROBE+SIG AMP: NEGATIVE
HSV SPECIMEN: ABNORMAL
HSV1 DNA SPEC QL NAA+PROBE: POSITIVE
HSV2 DNA SPEC QL NAA+PROBE: NEGATIVE
N GONORRHOEA DNA SPEC QL NAA+PROBE: NEGATIVE

## 2020-08-21 ENCOUNTER — RECORDS - HEALTHEAST (OUTPATIENT)
Dept: LAB | Facility: CLINIC | Age: 25
End: 2020-08-21

## 2020-08-22 LAB — BACTERIA SPEC CULT: ABNORMAL

## 2020-08-26 LAB
C TRACH DNA SPEC QL PROBE+SIG AMP: POSITIVE
N GONORRHOEA DNA SPEC QL NAA+PROBE: NEGATIVE

## 2020-09-15 ENCOUNTER — RECORDS - HEALTHEAST (OUTPATIENT)
Dept: LAB | Facility: CLINIC | Age: 25
End: 2020-09-15

## 2020-09-15 LAB — HIV 1+2 AB+HIV1 P24 AG SERPL QL IA: NEGATIVE

## 2021-05-30 NOTE — ANESTHESIA CARE TRANSFER NOTE
Last vitals:   Vitals:    07/23/19 0820   BP: 109/67   Pulse: 79   Resp: 16   Temp: 36.3  C (97.4  F)   SpO2: 100%     Patient spontaneous RR, TV 300s LMA to facemask 10LPM, O2 sats 100%. VSS. Report to RN.    Patient's level of consciousness is drowsy  Spontaneous respirations: yes  Maintains airway independently: yes  Dentition unchanged: yes  Oropharynx: oropharynx clear of all foreign objects    QCDR Measures:  ASA# 20 - Surgical Safety Checklist: WHO surgical safety checklist completed prior to induction    PQRS# 430 - Adult PONV Prevention: 4558F - Pt received => 2 anti-emetic agents (different classes) preop & intraop  ASA# 8 - Peds PONV Prevention: NA - Not pediatric patient, not GA or 2 or more risk factors NOT present  PQRS# 424 - Cassandra-op Temp Management: 4559F - At least one body temp DOCUMENTED => 35.5C or 95.9F within required timeframe  PQRS# 426 - PACU Transfer Protocol: - Transfer of care checklist used  ASA# 14 - Acute Post-op Pain: ASA14B - Patient did NOT experience pain >= 7 out of 10

## 2021-05-30 NOTE — PROGRESS NOTES
"History:  This is a 23 y.o.  female who I'm asked to see for evaluation of a right breast mass.  This was picked up by the patient.  The patient first felt a mass last month.  It is quite tender.  It occurs most commonly with palpation or when she lays on her right side.  It has not appeared to change in size.  She denies any rashes, nipple discharge, or nipple inversion.  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  She had one excised last year in the operating room.    PAST MEDICAL HISTORY:  Denies    PAST SURGICAL HISTORY:  Excision right breast mass on 4/9/18 by Dr. Jabari Lai (path fibroadenoma)    Current Outpatient Medications:      XULANE 150-35 mcg/24 hr, Apply 1 patch topically once a week., Disp: , Rfl: 3    Allergies:  Allergies   Allergen Reactions     Blood-Group Specific Substance Other (See Comments)     Patient has anti-Jka and nonspecific antibody. Blood product orders may be delayed. Draw one red top and two purple top tubes for all Type and Screen/Type and crossmatch orders.     Social History:   reports that she has never smoked. She has never used smokeless tobacco. She reports that she does not drink alcohol.  Denies illicit drug use.    Family History:  She has no family history of breast cancer.    ROS:  General: No complaints or constitutional symptoms  Skin: No complaints or symptoms   Hematologic/Lymphatic: No symptoms or complaints  Psychiatric: No symptoms or complaints  Endocrine: No excessive fatigue, no hypermetabolic symptoms reported  Respiratory: No cough, shortness of breath, or wheezing  Cardiovascular: No chest pain or dyspnea on exertion  Breast: Painful right breast mass  Gastrointestinal: No abdominal pain, nausea, diarrhea, or constipation  Musculoskeletal: No recent injuries reported  Neurological: No focal neurologic defects reported.      PHYSICAL EXAM  BP 90/60   Resp 18   Ht 5' 5\" (1.651 m)   Wt 129 lb (58.5 kg)   SpO2 100%   Breastfeeding? No   BMI 21.47 kg/m  "   General: Alert, cooperative, appears stated age   Skin: Skin color, texture, turgor normal, no rashes or lesions   Lymphatic: No obvious adenopathy, no swelling   Eyes: No scleral icterus, pupils equal  HENT: No traumatic injury to the head or face, no gross abnormalities  Lungs: Normal respiratory effort, breath sounds equal bilaterally  Heart: Regular rate and rhythm  Breasts: Symmetrical.  Scar in right upper inner quadrant from prior excisional biopsy.  No palpable lesions on left.  Right breast zone 2 10 o'clock position with an approximate 2 cm mobile subcutaneous mass.  Feels deep within the tissue.  Abdomen: Soft, non-distended and non-tender to palpation  Neurologic: Grossly intact    IMAGING  EXAM: DIAGNOSTIC RIGHT BREAST ULTRASOUND    CLINICAL INFORMATION: 23-year-old female with palpable lump in the outer upper quadrant of the right breast which occasionally is tender/painful with any type of pressure. Patient reports having a benign lump removed from the inner side of the same right breast.    COMPARISON: None    TECHNICAL INFORMATION: High-resolution real-time breast sonography with the use of color flow duplex doppler imaging technique where applicable.    INTERPRETATION: Patient is able to identify the clinically palpable lump which correlates with a solid right breast mass measuring 1.9 x 1.1 x 1.4 cm located in the 10 o'clock position 4 cm from the right nipple. Sonographic features are consistent with a benign fibroadenoma.    CONCLUSION: Clinically palpable lump correlates with what appear to be a benign fibroadenoma measuring up to 1.9 cm.    RECOMMENDATIONS: These findings were discussed in great detail with the patient at the time of imaging including options for her to continue periodic self evaluation and return for reassessment if there is significant change in size or clinical symptoms of the mass. She did express consideration for having been surgically removed similar to the other  right breast lump. If the patient decides to not have this surgically removed I suggest clinical follow-up and reassessment sonographically if there is no acute change. Although this is amenable for an ultrasound-guided core needle biopsy I do not see a definitive need for this at this time unless clinically otherwise indicated.    ACR BI-RADS 2 = Benign Finding. [facility] sent letter and/or contacted patient regarding results.    PATHOLOGY  None    IMPRESSION:  Right breast mass - imaging suggesting fibroadenoma.      PLAN:   Discussed the surgical options for treatment of a likely benign breast lesion with excisional biopsy.  This is typically an outpatient procedure under local MAC anesthetic.  The risks and benefits of surgery were explained.  Also talked about expected recovery time.  Further treatment will be dependent upon the final pathology.  We'll schedule an excisional right breast biopsy at her earliest convenience.      Isabella Hendricks, DO  [facility]  [phone]

## 2021-05-30 NOTE — ANESTHESIA POSTPROCEDURE EVALUATION
Patient: Gwyn Mike  EXCISION RIGHT BREAST MASS  Anesthesia type: general    Patient location: Phase II Recovery  Last vitals:   Vitals Value Taken Time   /57 7/23/2019  9:00 AM   Temp  7/23/2019  9:33 AM   Pulse 68 7/23/2019  9:00 AM   Resp 16 7/23/2019  9:00 AM   SpO2 99 % 7/23/2019  9:00 AM     Post vital signs: stable  Level of consciousness: awake and responds to simple questions  Post-anesthesia pain: pain controlled  Post-anesthesia nausea and vomiting: no  Pulmonary: unassisted, return to baseline  Cardiovascular: stable and blood pressure at baseline  Hydration: adequate  Anesthetic events: no    QCDR Measures:  ASA# 11 - Cassandra-op Cardiac Arrest: ASA11B - Patient did NOT experience unanticipated cardiac arrest  ASA# 12 - Cassandra-op Mortality Rate: ASA12B - Patient did NOT die  ASA# 13 - PACU Re-Intubation Rate: ASA13B - Patient did NOT require a new airway mgmt  ASA# 10 - Composite Anes Safety: ASA10A - No serious adverse event    Additional Notes:

## 2021-06-03 VITALS — WEIGHT: 129 LBS | HEIGHT: 65 IN | BODY MASS INDEX: 21.49 KG/M2

## 2021-06-03 VITALS — WEIGHT: 129 LBS | BODY MASS INDEX: 21.49 KG/M2 | HEIGHT: 65 IN

## 2021-11-30 ENCOUNTER — LAB REQUISITION (OUTPATIENT)
Dept: LAB | Facility: CLINIC | Age: 26
End: 2021-11-30

## 2021-11-30 ENCOUNTER — LAB REQUISITION (OUTPATIENT)
Dept: LAB | Facility: CLINIC | Age: 26
End: 2021-11-30
Payer: COMMERCIAL

## 2021-11-30 DIAGNOSIS — R52 PAIN, UNSPECIFIED: ICD-10-CM

## 2021-11-30 PROCEDURE — 87081 CULTURE SCREEN ONLY: CPT | Performed by: PHYSICIAN ASSISTANT

## 2021-11-30 PROCEDURE — U0005 INFEC AGEN DETEC AMPLI PROBE: HCPCS | Mod: ORL | Performed by: PHYSICIAN ASSISTANT

## 2021-12-01 LAB — SARS-COV-2 RNA RESP QL NAA+PROBE: NEGATIVE

## 2021-12-03 LAB — BACTERIA SPEC CULT: ABNORMAL

## 2022-10-13 ENCOUNTER — LAB REQUISITION (OUTPATIENT)
Dept: LAB | Facility: CLINIC | Age: 27
End: 2022-10-13

## 2022-10-13 DIAGNOSIS — J02.9 ACUTE PHARYNGITIS, UNSPECIFIED: ICD-10-CM

## 2022-10-13 PROCEDURE — 87081 CULTURE SCREEN ONLY: CPT | Performed by: STUDENT IN AN ORGANIZED HEALTH CARE EDUCATION/TRAINING PROGRAM

## 2022-10-16 LAB — BACTERIA SPEC CULT: NORMAL

## 2022-10-18 ENCOUNTER — LAB REQUISITION (OUTPATIENT)
Dept: LAB | Facility: CLINIC | Age: 27
End: 2022-10-18

## 2022-10-18 DIAGNOSIS — R30.0 DYSURIA: ICD-10-CM

## 2022-10-18 PROCEDURE — 87491 CHLMYD TRACH DNA AMP PROBE: CPT | Performed by: NURSE PRACTITIONER

## 2022-10-18 PROCEDURE — 87591 N.GONORRHOEAE DNA AMP PROB: CPT | Performed by: NURSE PRACTITIONER

## 2022-10-18 PROCEDURE — 87086 URINE CULTURE/COLONY COUNT: CPT | Performed by: NURSE PRACTITIONER

## 2022-10-19 LAB
C TRACH DNA SPEC QL PROBE+SIG AMP: NEGATIVE
N GONORRHOEA DNA SPEC QL NAA+PROBE: POSITIVE

## 2022-10-20 LAB — BACTERIA UR CULT: NORMAL

## 2023-06-12 ENCOUNTER — LAB REQUISITION (OUTPATIENT)
Dept: LAB | Facility: CLINIC | Age: 28
End: 2023-06-12

## 2023-06-12 DIAGNOSIS — Z12.4 ENCOUNTER FOR SCREENING FOR MALIGNANT NEOPLASM OF CERVIX: ICD-10-CM

## 2023-06-12 PROCEDURE — G0145 SCR C/V CYTO,THINLAYER,RESCR: HCPCS | Performed by: STUDENT IN AN ORGANIZED HEALTH CARE EDUCATION/TRAINING PROGRAM

## 2023-06-14 LAB
BKR LAB AP GYN ADEQUACY: NORMAL
BKR LAB AP GYN INTERPRETATION: NORMAL
BKR LAB AP HPV REFLEX: NORMAL
BKR LAB AP LMP: NORMAL
BKR LAB AP PREVIOUS ABNORMAL: NORMAL
PATH REPORT.COMMENTS IMP SPEC: NORMAL
PATH REPORT.COMMENTS IMP SPEC: NORMAL
PATH REPORT.RELEVANT HX SPEC: NORMAL